# Patient Record
Sex: FEMALE | URBAN - NONMETROPOLITAN AREA
[De-identification: names, ages, dates, MRNs, and addresses within clinical notes are randomized per-mention and may not be internally consistent; named-entity substitution may affect disease eponyms.]

---

## 2020-01-01 ENCOUNTER — TELEPHONE (OUTPATIENT)
Dept: FAMILY MEDICINE CLINIC | Facility: CLINIC | Age: 85
End: 2020-01-01

## 2020-01-01 ENCOUNTER — MED REC SCAN ONLY (OUTPATIENT)
Dept: FAMILY MEDICINE CLINIC | Facility: CLINIC | Age: 85
End: 2020-01-01

## 2020-01-01 RX ORDER — ACETAMINOPHEN 325 MG/1
650 TABLET ORAL EVERY 4 HOURS PRN
Refills: 0 | COMMUNITY
Start: 2020-01-01

## 2020-01-01 RX ORDER — GABAPENTIN 100 MG/1
100 CAPSULE ORAL 2 TIMES DAILY
Refills: 0 | COMMUNITY
Start: 2020-01-01

## 2020-01-01 RX ORDER — FUROSEMIDE 20 MG/1
20 TABLET ORAL EVERY MORNING
Refills: 0 | COMMUNITY
Start: 2020-01-01

## 2020-01-01 RX ORDER — ASPIRIN 81 MG
100 TABLET, DELAYED RELEASE (ENTERIC COATED) ORAL 2 TIMES DAILY
Refills: 0 | COMMUNITY
Start: 2020-01-01

## 2020-01-01 RX ORDER — HYDROCODONE BITARTRATE AND ACETAMINOPHEN 5; 325 MG/1; MG/1
TABLET ORAL
Qty: 1 TABLET | Refills: 0 | COMMUNITY
Start: 2020-01-01

## 2020-01-01 RX ORDER — POLYETHYLENE GLYCOL 3350 17 G/17G
POWDER, FOR SOLUTION ORAL
Refills: 0 | COMMUNITY
Start: 2020-01-01

## 2020-04-20 NOTE — TELEPHONE ENCOUNTER
Received fax from [facility] asking if Dr. Li Islas was agreeable to follow this patient. Dr. Li Islas was agreeable.

## 2020-04-20 NOTE — TELEPHONE ENCOUNTER
Spoke with Jarred Valdovinos at [facility], she states she would like Dr. Azam Frank to sign the paperwork faxed over today. He will be in office tomorrow, and is aware he needs to sign this.

## 2020-04-22 NOTE — TELEPHONE ENCOUNTER
Received fax from [facility] stating the following: \"Mandi Urbina Do would like her Norco PRN instead of scheduled QID, as she will refuse if she is not in pain. Please advise.  Thank you\"

## 2020-04-22 NOTE — TELEPHONE ENCOUNTER
Okay for verbal order to change it to as needed as needed for pain make that 4 times daily as needed as needed for pain

## 2020-04-22 NOTE — TELEPHONE ENCOUNTER
Spoke with Johandomenic Morley RN at [facility] with Dr. Stevie Cuellar verbal orders and she verbalized understanding.

## 2020-05-05 NOTE — TELEPHONE ENCOUNTER
Patient reports shortness of breath with increase during exertion. She reports taking Lasix at home but currently is not prescribed. Swelling is slightly noted in lower extremities. O2 Sat remains above 93% at this time. Can we have order for lasix?

## 2020-05-08 NOTE — TELEPHONE ENCOUNTER
rec'd a fax from [facility] stating \"Patt c/o exertional dyspnea after ambulation. Lung sounds clear. O2 sat 93%, R22. She has +2 pitting edema to both legs up to her shins. She is requesting an increase in her Lasix, which she started on 5/6/20.  She

## 2020-05-19 NOTE — TELEPHONE ENCOUNTER
WILL BE DISCHARGING HOME THURSDAY, NEED VERBAL ORDER TO CONTINUE PHYSICAL THERAPY AT HOME, ALSO IS IT OK TO PUT ON HER DISCHARGE PAPERS TO DISCONTINUE ELIQUIS 2.5MG ON 5/24? DOES SHE NEED TO FOLLOW UP WITH SURGEON OR DR Joel Grace?  ALSO IS IT OK TO DISCONTINU

## 2020-05-19 NOTE — TELEPHONE ENCOUNTER
Yes   Ok for [employer] on 5/24    Ok d/c pain medications    See surgeon  No need to see me

## 2020-05-29 NOTE — TELEPHONE ENCOUNTER
CAN SHE GET AN ORDER FOR PHYSICAL THERAPY?    IF SO THEN SHE WILL SEND THE PROPER PAPER HERE FOR THE DR TO SIGN